# Patient Record
Sex: FEMALE
[De-identification: names, ages, dates, MRNs, and addresses within clinical notes are randomized per-mention and may not be internally consistent; named-entity substitution may affect disease eponyms.]

---

## 2018-02-27 ENCOUNTER — HOSPITAL ENCOUNTER (OUTPATIENT)
Dept: HOSPITAL 92 - ULT | Age: 73
Discharge: HOME | End: 2018-02-27
Attending: FAMILY MEDICINE
Payer: MEDICARE

## 2018-02-27 VITALS — SYSTOLIC BLOOD PRESSURE: 184 MMHG | DIASTOLIC BLOOD PRESSURE: 79 MMHG | TEMPERATURE: 98.3 F

## 2018-02-27 VITALS — BODY MASS INDEX: 32.5 KG/M2

## 2018-02-27 DIAGNOSIS — I10: ICD-10-CM

## 2018-02-27 DIAGNOSIS — Z86.73: ICD-10-CM

## 2018-02-27 DIAGNOSIS — E04.1: Primary | ICD-10-CM

## 2018-02-27 PROCEDURE — 76942 ECHO GUIDE FOR BIOPSY: CPT

## 2018-02-27 PROCEDURE — 88173 CYTOPATH EVAL FNA REPORT: CPT

## 2018-02-27 PROCEDURE — 10022: CPT

## 2018-02-27 PROCEDURE — 0G9G3ZX DRAINAGE OF LEFT THYROID GLAND LOBE, PERCUTANEOUS APPROACH, DIAGNOSTIC: ICD-10-PCS | Performed by: FAMILY MEDICINE

## 2018-02-27 NOTE — ULT
ULTRASOUND THYROID FINE NEEDLE ASPIRATION WITH ULTRASOUND GUIDANCE:

 

COMPARISON: 

Outside facility ultrasound 12/23/17.

 

FINDINGS: 

The patient was brought to the ultrasound suite.  All questions were answered.

 

The patient's neck was prepped and draped in normal sterile fashion.  There is revisualization of the
 1.2 cm left interpolar nodule.

 

Approximately 3 mm of buffered Lidocaine was instilled into the superficial and deep soft tissues for
 anesthesia.  Using a 25-gauge needle, a total of 4 passes were obtained through the nodule.  The pat
ient tolerated the procedure well without complication.  Samples were given to pathology.

 

IMPRESSION: 

Technically successful left thyroid nodule fine needle aspiration.

 

POS: [facility]

## 2022-04-28 ENCOUNTER — HOSPITAL ENCOUNTER (EMERGENCY)
Dept: HOSPITAL 92 - ERS | Age: 77
Discharge: HOME | End: 2022-04-28
Payer: MEDICARE

## 2022-04-28 DIAGNOSIS — R20.2: ICD-10-CM

## 2022-04-28 DIAGNOSIS — I10: ICD-10-CM

## 2022-04-28 DIAGNOSIS — M54.50: Primary | ICD-10-CM

## 2022-04-28 PROCEDURE — 99283 EMERGENCY DEPT VISIT LOW MDM: CPT
